# Patient Record
Sex: MALE | Race: WHITE | Employment: FULL TIME | URBAN - METROPOLITAN AREA
[De-identification: names, ages, dates, MRNs, and addresses within clinical notes are randomized per-mention and may not be internally consistent; named-entity substitution may affect disease eponyms.]

---

## 2018-12-02 ENCOUNTER — HOSPITAL ENCOUNTER (EMERGENCY)
Facility: CLINIC | Age: 33
Discharge: HOME | End: 2018-12-02
Attending: EMERGENCY MEDICINE | Admitting: EMERGENCY MEDICINE
Payer: COMMERCIAL

## 2018-12-02 VITALS
RESPIRATION RATE: 18 BRPM | TEMPERATURE: 97.7 F | DIASTOLIC BLOOD PRESSURE: 85 MMHG | OXYGEN SATURATION: 98 % | HEART RATE: 60 BPM | SYSTOLIC BLOOD PRESSURE: 128 MMHG

## 2018-12-02 DIAGNOSIS — F43.9 SITUATIONAL STRESS: ICD-10-CM

## 2018-12-02 DIAGNOSIS — F32.1 CURRENT MODERATE EPISODE OF MAJOR DEPRESSIVE DISORDER, UNSPECIFIED WHETHER RECURRENT (H): ICD-10-CM

## 2018-12-02 LAB
AMPHETAMINES UR QL SCN: NEGATIVE
BARBITURATES UR QL: NEGATIVE
BENZODIAZ UR QL: NEGATIVE
CANNABINOIDS UR QL SCN: NEGATIVE
COCAINE UR QL: NEGATIVE
ETHANOL SERPL-MCNC: <0.01 G/DL
OPIATES UR QL SCN: NEGATIVE
PCP UR QL SCN: NEGATIVE

## 2018-12-02 PROCEDURE — 99285 EMERGENCY DEPT VISIT HI MDM: CPT | Mod: 25

## 2018-12-02 PROCEDURE — 80307 DRUG TEST PRSMV CHEM ANLYZR: CPT | Performed by: EMERGENCY MEDICINE

## 2018-12-02 PROCEDURE — 90791 PSYCH DIAGNOSTIC EVALUATION: CPT

## 2018-12-02 PROCEDURE — 80320 DRUG SCREEN QUANTALCOHOLS: CPT | Performed by: EMERGENCY MEDICINE

## 2018-12-02 PROCEDURE — 25000132 ZZH RX MED GY IP 250 OP 250 PS 637: Performed by: EMERGENCY MEDICINE

## 2018-12-02 PROCEDURE — 36415 COLL VENOUS BLD VENIPUNCTURE: CPT | Performed by: EMERGENCY MEDICINE

## 2018-12-02 RX ORDER — DIPHENHYDRAMINE HCL 25 MG
25 CAPSULE ORAL ONCE
Status: DISCONTINUED | OUTPATIENT
Start: 2018-12-02 | End: 2018-12-02 | Stop reason: HOSPADM

## 2018-12-02 ASSESSMENT — ENCOUNTER SYMPTOMS: NERVOUS/ANXIOUS: 1

## 2018-12-02 NOTE — ED PROVIDER NOTES
History     Chief Complaint:  Suicidal ideation    HPI   HPI limited secondary to language barrier. HPI provided through interpretation by patient's wife.    Brandan Dixon is a 33 year old male, with a history of depression and anxiety, who presents with his wife to the emergency department for evaluation of suicidal ideation. The patient reports he moved to the [place] about six months ago from [facility] and has been having difficulty adapting. He reports he is  with a baby but does note panic attacks almost every day. He reports his family is currently living with his wife's parents and the patient finds it very difficult to live with her father because he makes him anxious and makes him feel like he is losing control over himself and his situation. He reports thoughts of self harm and suicide all the time and occasionally leaves home during these thoughts. He reports a suicidal plan of hanging himself and did have something around his neck today.    Allergies:  No known drug allergies     Medications:    The patient is not currently taking any prescribed medications.    Past Medical History:    Depression  Anxiety    Past Surgical History:    History reviewed. No pertinent surgical history.    Family History:    History reviewed. No pertinent family history.     Social History:  The patient presents to the emergency department with his wife.  Marital Status:   [2]     Review of Systems   Psychiatric/Behavioral: Positive for suicidal ideas. The patient is nervous/anxious.    All other systems reviewed and are negative.        Physical Exam   First Vitals:  BP: 128/85  Pulse: 60  Temp: 97.7  F (36.5  C)  Resp: 18  SpO2: 98 %    Physical Exam  General: The patient has a flat affect, in no respiratory distress.    HENT: Mucous membranes moist.    Cardiovascular: Regular rate and rhythm. Good pulses in all four extremities. Normal capillary refill and skin turgor.     Respiratory: Lungs are  clear. No nasal flaring. No retractions. No wheezing, no crackles.    Gastrointestinal: Abdomen soft. No guarding, no rebound. No palpable hernias.     Musculoskeletal: No gross deformity.     Skin: No rashes or petechiae.     Neurologic: The patient is alert and oriented x3. GCS 15. No testable cranial nerve deficit. Follows commands with clear and appropriate speech. Gives appropriate answers. Good strength in all extremities. No gross neurologic deficit. Gross sensation intact. Pupils are round and reactive. No meningismus.     Lymphatic: No cervical adenopathy. No lower extremity swelling.    Psychiatric: The patient is non-tearful. Patient did voice thoughts of harming himself.    Emergency Department Course   Laboratory:  Drug abuse screen 77 urine: Negative  Alcohol level blood: <0.01    Emergency Department Course:  Past medical records, nursing notes, and vitals reviewed.  1435: I performed an exam of the patient and obtained history, as documented above.    1517: I rechecked the patient.    1609: I discussed the patient with the DEC .    1706: I discussed the patient's assessment by the DEC  with DEC.    1724: I discussed the patient with DEC. DEC reports discussing the patient with Je, the patient's brother-in-law, who states the patient has never actively tried to harm himself.    1804: I rechecked the patient. Explained findings to the patient and his wife. Patient contracted for safety and reports an appointment in 2 days.    Findings and plan explained to the Patient and spouse. Patient discharged home with instructions regarding supportive care, medications, and reasons to return. The importance of close follow-up was reviewed.   Impression & Plan    Medical Decision Making:  The patient did speak english and spoke Bhutanese as well, I did offer an interpretor but his wife did well. The patient said that he has been depressed and has had some stressors since he has moved here from  [place] and lives with his father-in-law. There has been no verbal or physical confrontations. The patient has been under stress and has gone to a relative's house. After conversing with everyone, looks like there has been no dangerous behavior. He has been having fleeting thoughts of suicide but does not have a plan. No one feels like he's a danger to himself. We arranged an appointment in two days for him, at that point they can start medication. He is negative for alcohol and drugs. He is otherwise in good condition. He was discharged after I contracted for safety and both the patient and his wife say they will bring him back if he becomes actively suicidal or has a plan.     Diagnosis:    ICD-10-CM   1. Current moderate episode of major depressive disorder, unspecified whether recurrent (H) F32.1   2. Situational stress F43.9     Disposition:  Discharged to home with instructions for follow up.  Bjorn Go  12/2/2018   [facility] EMERGENCY DEPARTMENT  Scribe Disclosure:  I, Bjorn Go, am serving as a scribe at 2:35 PM on 12/2/2018 to document services personally performed by Manny Hutchinson MD based on my observations and the provider's statements to me.      Manny Hutchinson MD  12/02/18 2441

## 2018-12-02 NOTE — ED AVS SNAPSHOT
[facility] Emergency Department    [address]    [place] [address]    Phone:  [phone]    Fax:  [phone]                                       Brandan Dixon   MRN: [id number]    Department:  [facility] Emergency Department   Date of Visit:  12/2/2018           After Visit Summary Signature Page     I have received my discharge instructions, and my questions have been answered. I have discussed any challenges I see with this plan with the nurse or doctor.    ..........................................................................................................................................  Patient/Patient Representative Signature      ..........................................................................................................................................  Patient Representative Print Name and Relationship to Patient    ..................................................               ................................................  Date                                   Time    ..........................................................................................................................................  Reviewed by Signature/Title    ...................................................              ..............................................  Date                                               Time          22EPIC Rev 08/18

## 2018-12-02 NOTE — ED TRIAGE NOTES
Patient comes in with ahistory of depression and anxiety feeling panic, states he feels weak, hyper alert to surroundings, chest pain. Does have recurring thoughts of suicidal ideation. ABCs intact.

## 2018-12-02 NOTE — ED AVS SNAPSHOT
[facility] Emergency Department    [address] [place] [address]    Phone:  [phone]    Fax:  [phone]                                       Brandan Dixon   MRN: [id number]    Department:  [facility] Emergency Department   Date of Visit:  12/2/2018           Patient Information     Date Of Birth          1985        Your diagnoses for this visit were:     Current moderate episode of major depressive disorder, unspecified whether recurrent (H)     Situational stress        You were seen by Manny Hutchinson MD.      Follow-up Information     Follow up with psychiatry  In 2 days.      Discharge References/Attachments     DEPRESSION (Thai)      24 Hour Appointment Hotline       To make an appointment at any [facility] clinic, call [phone] ([phone]). If you don't have a family doctor or clinic, we will help you find one. [facility] clinics are conveniently located to serve the needs of you and your family.             Review of your medicines      Notice     You have not been prescribed any medications.            Procedures and tests performed during your visit     Alcohol level blood    Drug abuse screen 77 urine      Orders Needing Specimen Collection     None      Pending Results     No orders found from 11/30/2018 to 12/3/2018.            Pending Culture Results     No orders found from 11/30/2018 to 12/3/2018.            Pending Results Instructions     If you had any lab results that were not finalized at the time of your Discharge, you can call the ED Lab Result RN at [phone]. You will be contacted by this team for any positive Lab results or changes in treatment. The nurses are available 7 days a week from 10A to 6:30P.  You can leave a message 24 hours per day and they will return your call.        Test Results From Your Hospital Stay        12/2/2018  3:54 PM      Component Results     Component Value Ref Range & Units  Status    Amphetamine Qual Urine Negative NEG^Negative Final    Cutoff for a negative amphetamine is 500 ng/mL or less.    Barbiturates Qual Urine Negative NEG^Negative Final    Cutoff for a negative barbiturate is 200 ng/mL or less.    Benzodiazepine Qual Urine Negative NEG^Negative Final    Cutoff for a negative benzodiazepine is 200 ng/mL or less.    Cannabinoids Qual Urine Negative NEG^Negative Final    Cutoff for a negative cannabinoid is 50 ng/mL or less.    Cocaine Qual Urine Negative NEG^Negative Final    Cutoff for a negative cocaine is 300 ng/mL or less.    Opiates Qualitative Urine Negative NEG^Negative Final    Cutoff for a negative opiate is 300 ng/mL or less.    PCP Qual Urine Negative NEG^Negative Final    Cutoff for a negative PCP is 25 ng/mL or less.         12/2/2018  4:41 PM      Component Results     Component Value Ref Range & Units Status    Ethanol g/dL <0.01 <0.01 g/dL Final                Clinical Quality Measure: Blood Pressure Screening     Your blood pressure was checked while you were in the emergency department today. The last reading we obtained was  BP: 128/85 . Please read the guidelines below about what these numbers mean and what you should do about them.  If your systolic blood pressure (the top number) is less than 120 and your diastolic blood pressure (the bottom number) is less than 80, then your blood pressure is normal. There is nothing more that you need to do about it.  If your systolic blood pressure (the top number) is 120-139 or your diastolic blood pressure (the bottom number) is 80-89, your blood pressure may be higher than it should be. You should have your blood pressure rechecked within a year by a primary care provider.  If your systolic blood pressure (the top number) is 140 or greater or your diastolic blood pressure (the bottom number) is 90 or greater, you may have high blood pressure. High blood pressure is treatable, but if left untreated over time it can put  "you at risk for heart attack, stroke, or kidney failure. You should have your blood pressure rechecked by a primary care provider within the next 4 weeks.  If your provider in the emergency department today gave you specific instructions to follow-up with your doctor or provider even sooner than that, you should follow that instruction and not wait for up to 4 weeks for your follow-up visit.        Thank you for choosing [facility]       Thank you for choosing [facility] for your care. Our goal is always to provide you with excellent care. Hearing back from our patients is one way we can continue to improve our services. Please take a few minutes to complete the written survey that you may receive in the mail after you visit with us. Thank you!        [employer] Information     [employer] lets you send messages to your doctor, view your test results, renew your prescriptions, schedule appointments and more. To sign up, go to [email or web address] . Click on \"Log in\" on the left side of the screen, which will take you to the Welcome page. Then click on \"Sign up Now\" on the right side of the page.     You will be asked to enter the access code listed below, as well as some personal information. Please follow the directions to create your username and password.     Your access code is: [email or web address]  Expires: 2018  8:35 AM     Your access code will  in 90 days. If you need help or a new code, please call your [facility] clinic or [phone].        Care EveryWhere ID     This is your Care EveryWhere ID. This could be used by other organizations to access your [facility] medical records  [id number]        Equal Access to Services     [facility] : Hadii ariadna maza Soluis miguel, waaxda luqadaha, qaybta kaalmamari jose . So [facility] [phone].    ATENCIÓN: Si habla español, tiene a valdes disposición servicios gratuitos de asistencia lingüística. Llame al [phone].    We " comply with applicable federal civil rights laws and [place] laws. We do not discriminate on the basis of race, color, national origin, age, disability, sex, sexual orientation, or gender identity.            After Visit Summary       This is your record. Keep this with you and show to your community pharmacist(s) and doctor(s) at your next visit.

## 2021-01-10 ENCOUNTER — OFFICE VISIT (OUTPATIENT)
Dept: URGENT CARE | Facility: URGENT CARE | Age: 36
End: 2021-01-10
Payer: COMMERCIAL

## 2021-01-10 VITALS
TEMPERATURE: 98.9 F | SYSTOLIC BLOOD PRESSURE: 120 MMHG | DIASTOLIC BLOOD PRESSURE: 68 MMHG | RESPIRATION RATE: 14 BRPM | HEIGHT: 69 IN | HEART RATE: 65 BPM | BODY MASS INDEX: 27.4 KG/M2 | OXYGEN SATURATION: 99 % | WEIGHT: 185 LBS

## 2021-01-10 DIAGNOSIS — M79.10 MYALGIA: Primary | ICD-10-CM

## 2021-01-10 LAB
ALBUMIN SERPL-MCNC: 4 G/DL (ref 3.4–5)
ALP SERPL-CCNC: 121 U/L (ref 40–150)
ALT SERPL W P-5'-P-CCNC: 40 U/L (ref 0–70)
ANION GAP SERPL CALCULATED.3IONS-SCNC: 5 MMOL/L (ref 3–14)
AST SERPL W P-5'-P-CCNC: 29 U/L (ref 0–45)
BASOPHILS # BLD AUTO: 0 10E9/L (ref 0–0.2)
BASOPHILS NFR BLD AUTO: 0.6 %
BILIRUB SERPL-MCNC: 0.3 MG/DL (ref 0.2–1.3)
BUN SERPL-MCNC: 20 MG/DL (ref 7–30)
CALCIUM SERPL-MCNC: 8.7 MG/DL (ref 8.5–10.1)
CHLORIDE SERPL-SCNC: 105 MMOL/L (ref 94–109)
CK SERPL-CCNC: 481 U/L (ref 30–300)
CO2 SERPL-SCNC: 28 MMOL/L (ref 20–32)
CREAT SERPL-MCNC: 0.96 MG/DL (ref 0.66–1.25)
CRP SERPL-MCNC: <2.9 MG/L (ref 0–8)
DIFFERENTIAL METHOD BLD: NORMAL
EOSINOPHIL # BLD AUTO: 0.2 10E9/L (ref 0–0.7)
EOSINOPHIL NFR BLD AUTO: 4 %
ERYTHROCYTE [DISTWIDTH] IN BLOOD BY AUTOMATED COUNT: 14.1 % (ref 10–15)
FLUAV+FLUBV AG SPEC QL: NEGATIVE
FLUAV+FLUBV AG SPEC QL: NEGATIVE
GFR SERPL CREATININE-BSD FRML MDRD: >90 ML/MIN/{1.73_M2}
GLUCOSE SERPL-MCNC: 103 MG/DL (ref 70–99)
HCT VFR BLD AUTO: 41.8 % (ref 40–53)
HETEROPH AB SER QL: NEGATIVE
HGB BLD-MCNC: 13.7 G/DL (ref 13.3–17.7)
LYMPHOCYTES # BLD AUTO: 1.9 10E9/L (ref 0.8–5.3)
LYMPHOCYTES NFR BLD AUTO: 37.1 %
MCH RBC QN AUTO: 27.2 PG (ref 26.5–33)
MCHC RBC AUTO-ENTMCNC: 32.8 G/DL (ref 31.5–36.5)
MCV RBC AUTO: 83 FL (ref 78–100)
MONOCYTES # BLD AUTO: 0.4 10E9/L (ref 0–1.3)
MONOCYTES NFR BLD AUTO: 8.2 %
NEUTROPHILS # BLD AUTO: 2.5 10E9/L (ref 1.6–8.3)
NEUTROPHILS NFR BLD AUTO: 50.1 %
PLATELET # BLD AUTO: 224 10E9/L (ref 150–450)
POTASSIUM SERPL-SCNC: 3.8 MMOL/L (ref 3.4–5.3)
PROT SERPL-MCNC: 7.3 G/DL (ref 6.8–8.8)
RBC # BLD AUTO: 5.03 10E12/L (ref 4.4–5.9)
SODIUM SERPL-SCNC: 138 MMOL/L (ref 133–144)
SPECIMEN SOURCE: NORMAL
TSH SERPL DL<=0.005 MIU/L-ACNC: 1.92 MU/L (ref 0.4–4)
WBC # BLD AUTO: 5 10E9/L (ref 4–11)

## 2021-01-10 PROCEDURE — 86140 C-REACTIVE PROTEIN: CPT | Performed by: PREVENTIVE MEDICINE

## 2021-01-10 PROCEDURE — 82550 ASSAY OF CK (CPK): CPT | Performed by: PREVENTIVE MEDICINE

## 2021-01-10 PROCEDURE — U0005 INFEC AGEN DETEC AMPLI PROBE: HCPCS | Performed by: PREVENTIVE MEDICINE

## 2021-01-10 PROCEDURE — 80050 GENERAL HEALTH PANEL: CPT | Performed by: PREVENTIVE MEDICINE

## 2021-01-10 PROCEDURE — U0003 INFECTIOUS AGENT DETECTION BY NUCLEIC ACID (DNA OR RNA); SEVERE ACUTE RESPIRATORY SYNDROME CORONAVIRUS 2 (SARS-COV-2) (CORONAVIRUS DISEASE [COVID-19]), AMPLIFIED PROBE TECHNIQUE, MAKING USE OF HIGH THROUGHPUT TECHNOLOGIES AS DESCRIBED BY CMS-2020-01-R: HCPCS | Performed by: PREVENTIVE MEDICINE

## 2021-01-10 PROCEDURE — 86308 HETEROPHILE ANTIBODY SCREEN: CPT | Performed by: PREVENTIVE MEDICINE

## 2021-01-10 PROCEDURE — 87804 INFLUENZA ASSAY W/OPTIC: CPT | Performed by: PREVENTIVE MEDICINE

## 2021-01-10 PROCEDURE — 99204 OFFICE O/P NEW MOD 45 MIN: CPT | Performed by: PREVENTIVE MEDICINE

## 2021-01-10 PROCEDURE — 36415 COLL VENOUS BLD VENIPUNCTURE: CPT | Performed by: PREVENTIVE MEDICINE

## 2021-01-10 RX ORDER — SERTRALINE HYDROCHLORIDE 100 MG/1
TABLET, FILM COATED ORAL
COMMUNITY
Start: 2020-11-27

## 2021-01-10 RX ORDER — TRAZODONE HYDROCHLORIDE 50 MG/1
TABLET, FILM COATED ORAL
COMMUNITY
Start: 2020-12-25

## 2021-01-10 ASSESSMENT — MIFFLIN-ST. JEOR: SCORE: 1759.53

## 2021-01-10 NOTE — LETTER
[facility] [place]  [address] [place] [address]  Phone: [phone]    January 10, 2021        Brandan Dixon  [address] [address]          To whom it may concern:    RE: Brandan Dixon    Patient was seen and treated today at our clinic.  He may be off work on 1/11/2021 due to illness.    Please contact me for questions or concerns.      Sincerely,        Noam Traylor MD

## 2021-01-10 NOTE — PROGRESS NOTES
"SUBJECTIVE:  Brandan Dixon, a 35 year old male scheduled an appointment to discuss the following issues:  Fatigue  2 weeks ago  Tired  Sleeping ok  Achiness in both and  No cough  No fever  No difficulty breathing  No bleeding    No sick contacts  [employer]  Patient has been feeling fatigued and has some generalized achiness for the past 3 weeks.  No obvious sick contacts.  No congestion, cough, fever or chills, sore throat, chest pain.  Sleeping ok.  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  Was seen 3 years ago at [facility] with suicidal ideation.  States he feels like his depression is worse but has been taking his sertraline and trazodone regularly.  Wonders if there is a medical cause for his symptoms other than depressoin.  Feels rested upon awakening in the morning.  Patient with a history of depression. Also has a 3 yo baby at home and his wife is pregnant.  No SI or HI or delusions or hallucinations    Medicines - trazodone, sertraline - same over the past year    PMH - depression with suicidal ideation  SH - no smoking, no drugs, no alcohol, no recreational drug use  FH - non contributory    Medical, social, surgical, and family histories reviewed.    ROS:  CONSTITUTIONAL: NEGATIVE for fever, chills  EYES: NEGATIVE for vision changes   RESP: NEGATIVE for significant cough or SOB  CV: NEGATIVE for chest pain, palpitations   GI: NEGATIVE for nausea, abdominal pain, heartburn, or change in bowel habits  : NEGATIVE for frequency, dysuria, or hematuria  MUSCULOSKELETAL: NEGATIVE for significant arthralgias or myalgia  NEURO: NEGATIVE for weakness, dizziness or paresthesias or headache    OBJECTIVE:  /68   Pulse 65   Temp 98.9  F (37.2  C) (Oral)   Resp 14   Ht 1.753 m (5' 9\")   Wt 83.9 kg (185 lb)   SpO2 99%   BMI 27.32 kg/m    EXAM:  GENERAL APPEARANCE: healthy, alert and no distress  EYES: EOMI,  PERRL  HENT: ear canals and TM's normal " and nose and mouth without ulcers or lesions  RESP: lungs clear to auscultation - no rales, rhonchi or wheezes  CV: regular rates and rhythm, normal S1 S2, no S3 or S4 and no murmur, click or rub -  ABDOMEN:  soft, nontender, no HSM or masses and bowel sounds normal  EXTREMITIES:  Warm, well perfused, no edema  SKIN - no rashes or bruising    CBC wnl  Monospot negative  Influenza negative  COVID pending  CMP, CRP, TSH, CK all pending    Possible causes of symptoms are many - depression, CARMENCITA, hypothyroidism, anemia, other medical causes  Will assess for these and have close follow up with psychiatry and pcp    ASSESSMENT/PLAN:  (R53.83) Fatigue  (primary encounter diagnosis)  Plan: Symptomatic COVID-19 Virus (Coronavirus) by PCR  Suspect worsening depression  Contracted for safety  To see psychiatrist tomorrow  Will evaluate for medical causes with CK, CBC, CMP, COVID    Myalgia - CK, related to flu like illness?    Isolate until covid negative  Push fluids  Close follow up with psychiatry tomorrow.  Contracted for safety    45 minutes spent coordinating care, chart review, history taking and physical examination, considering possible causes of patient's symptoms and , explaining about nature of the patient's conditions and best initial treatment plan.

## 2021-01-11 NOTE — PATIENT INSTRUCTIONS
(R53.83) Fatigue  (primary encounter diagnosis)  Plan: Symptomatic COVID-19 Virus (Coronavirus) by PCR  Suspect worsening depression  Contracted for safety  To see psychiatrist tomorrow  Will evaluate for medical causes with CK, CBC, CMP, COVID    Myalgia - CK, related to flu like illness?    Isolate until covid negative  Push fluids  Close follow up with psychiatry tomorrow.  Contracted for safety

## 2021-01-12 LAB
SARS-COV-2 RNA RESP QL NAA+PROBE: NOT DETECTED
SPECIMEN SOURCE: NORMAL

## 2021-01-15 ENCOUNTER — HEALTH MAINTENANCE LETTER (OUTPATIENT)
Age: 36
End: 2021-01-15

## 2021-08-10 ENCOUNTER — HOSPITAL ENCOUNTER (EMERGENCY)
Facility: CLINIC | Age: 36
Discharge: LEFT BEFORE DISCHARGE | End: 2021-08-10
Admitting: EMERGENCY MEDICINE
Payer: COMMERCIAL

## 2021-08-10 VITALS
SYSTOLIC BLOOD PRESSURE: 121 MMHG | DIASTOLIC BLOOD PRESSURE: 67 MMHG | OXYGEN SATURATION: 98 % | BODY MASS INDEX: 27.4 KG/M2 | HEIGHT: 69 IN | TEMPERATURE: 98.3 F | WEIGHT: 185 LBS | HEART RATE: 45 BPM

## 2021-08-10 LAB — SARS-COV-2 RNA RESP QL NAA+PROBE: NEGATIVE

## 2021-08-10 PROCEDURE — U0003 INFECTIOUS AGENT DETECTION BY NUCLEIC ACID (DNA OR RNA); SEVERE ACUTE RESPIRATORY SYNDROME CORONAVIRUS 2 (SARS-COV-2) (CORONAVIRUS DISEASE [COVID-19]), AMPLIFIED PROBE TECHNIQUE, MAKING USE OF HIGH THROUGHPUT TECHNOLOGIES AS DESCRIBED BY CMS-2020-01-R: HCPCS | Performed by: EMERGENCY MEDICINE

## 2021-08-10 PROCEDURE — C9803 HOPD COVID-19 SPEC COLLECT: HCPCS

## 2021-08-10 PROCEDURE — 999N000104 HC STATISTIC NO CHARGE

## 2021-08-10 ASSESSMENT — MIFFLIN-ST. JEOR: SCORE: 1754.53

## 2021-08-10 NOTE — ED TRIAGE NOTES
Pt. Presents to ED with concerns for COVID. Pt. Reports he has a cough, headache, dizziness, and generalized weakness. Pt. Reports he is fully vaccinated. AVSS on RA.

## 2021-10-10 ENCOUNTER — HEALTH MAINTENANCE LETTER (OUTPATIENT)
Age: 36
End: 2021-10-10

## 2021-11-08 ENCOUNTER — OFFICE VISIT (OUTPATIENT)
Dept: URGENT CARE | Facility: URGENT CARE | Age: 36
End: 2021-11-08
Payer: COMMERCIAL

## 2021-11-08 VITALS
SYSTOLIC BLOOD PRESSURE: 110 MMHG | WEIGHT: 190 LBS | TEMPERATURE: 96.9 F | BODY MASS INDEX: 28.14 KG/M2 | HEIGHT: 69 IN | OXYGEN SATURATION: 97 % | HEART RATE: 109 BPM | DIASTOLIC BLOOD PRESSURE: 67 MMHG

## 2021-11-08 DIAGNOSIS — J06.9 UPPER RESPIRATORY TRACT INFECTION, UNSPECIFIED TYPE: Primary | ICD-10-CM

## 2021-11-08 DIAGNOSIS — R52 ACHES: ICD-10-CM

## 2021-11-08 LAB
DEPRECATED S PYO AG THROAT QL EIA: NEGATIVE
FLUAV AG SPEC QL IA: NEGATIVE
FLUBV AG SPEC QL IA: NEGATIVE

## 2021-11-08 PROCEDURE — U0003 INFECTIOUS AGENT DETECTION BY NUCLEIC ACID (DNA OR RNA); SEVERE ACUTE RESPIRATORY SYNDROME CORONAVIRUS 2 (SARS-COV-2) (CORONAVIRUS DISEASE [COVID-19]), AMPLIFIED PROBE TECHNIQUE, MAKING USE OF HIGH THROUGHPUT TECHNOLOGIES AS DESCRIBED BY CMS-2020-01-R: HCPCS | Performed by: PHYSICIAN ASSISTANT

## 2021-11-08 PROCEDURE — 87651 STREP A DNA AMP PROBE: CPT | Performed by: PHYSICIAN ASSISTANT

## 2021-11-08 PROCEDURE — 99203 OFFICE O/P NEW LOW 30 MIN: CPT | Performed by: PHYSICIAN ASSISTANT

## 2021-11-08 PROCEDURE — 87804 INFLUENZA ASSAY W/OPTIC: CPT | Performed by: PHYSICIAN ASSISTANT

## 2021-11-08 PROCEDURE — U0005 INFEC AGEN DETEC AMPLI PROBE: HCPCS | Performed by: PHYSICIAN ASSISTANT

## 2021-11-08 RX ORDER — IBUPROFEN 200 MG
200 TABLET ORAL EVERY 4 HOURS PRN
COMMUNITY

## 2021-11-08 RX ORDER — ACETAMINOPHEN 325 MG/1
325-650 TABLET ORAL EVERY 6 HOURS PRN
COMMUNITY

## 2021-11-08 ASSESSMENT — MIFFLIN-ST. JEOR: SCORE: 1777.21

## 2021-11-08 NOTE — PROGRESS NOTES
Aches  - Streptococcus A Rapid Screen w/Reflex to PCR - Clinic Collect  - Symptomatic COVID-19 Virus (Coronavirus) by PCR Nose; Future  - Influenza A/B antigen  - Symptomatic COVID-19 Virus (Coronavirus) by PCR Nose  - Group A Streptococcus PCR Throat Swab    Upper respiratory tract infection, unspecified type  Age 12 months or more  Okay to use Zarbee's   Okay to use Rx Children Tylenol if prescribed (Dose based on weight)    Age 2-12:   Okay to use Children Motrin or Tylenol over the counter.    Adults:  Okay to take acetaminophen 500 mg- 2 tabs (Total of 1000 mg) every 8 hrs   Okay to take ibuprofen 200 mg- 3 tabs (Total of 600 mg) every 6 hours        Okay to use Neti pot for sinus lavage up to three times daily for congestion and sinus pressure if present. Daily hot shower can be beneficial for congestion and body aches. Okay to use bedroom vaporizer or humidifier if symptoms are worse at night. Nightly Vicks Vapor rub and 5-10 mg of Melatonin okay to use for sleep.     Over the counter cough medication and decongestants okay if not prescribed by me during this visit. For homeopathic alternatives to cough syrup and decongestant, feel free to try Elderberry extract.    Okay to use salt water gargles, warm tea (or warm water with lemon and honey), and lozenges for any throat discomfort. Chloraseptic spray is also highly encourages for throat pain/irritation.     Patient will need to get plenty of rest and drink at least 1.5-2 liters of fluids daily for adults and 1-1.5 liters for children. If vomiting and not tolerating liquids for more than 24 hrs, please go to your nearest emergency department for IV fluids and further treatment.     Patient is not contagious after 1 week from start of symptoms. If possible, wear mask for first 7 days. Wash hands regularly and vigorously for 30 seconds often.     20 minutes spent on the date of the encounter doing chart review, history and exam, documentation and further  activities per the note     RADHA Amador [facility] U[facility]    Subjective   36 year old who presents to clinic today for the following health issues:    Urgent Care, Generalized Body Aches, Cough, and Nasal Congestion       HPI     Acute Illness  Acute illness concerns: Pt in clinic c/o cough, aches, fatigue, and congestion.  Onset/Duration: 5 days  Symptoms:  Fever: no  Chills/Sweats: no  Headache (location?): no  Sinus Pressure: no  Conjunctivitis:  no  Ear Pain: no  Rhinorrhea: YES  Congestion: YES  Sore Throat: YES  Cough: YES  Wheeze: no  Decreased Appetite: no  Nausea: no  Vomiting: no  Diarrhea: no  Dysuria/Freq.: no  Dysuria or Hematuria: no  Fatigue/Achiness: YES  Sick/Strep Exposure: None known  Therapies tried and outcome: None    Review of Systems   Review of Systems   See HPI     Objective    Temp: 96.9  F (36.1  C) Temp src: Oral BP: 110/67 Pulse: 109     SpO2: 97 %       Physical Exam   Physical Exam  Constitutional:       General: He is not in acute distress.     Appearance: Normal appearance. He is normal weight. He is not ill-appearing, toxic-appearing or diaphoretic.   HENT:      Head: Normocephalic and atraumatic.      Nose: Congestion and rhinorrhea present.      Mouth/Throat:      Mouth: Mucous membranes are moist.      Pharynx: Oropharynx is clear. No oropharyngeal exudate or posterior oropharyngeal erythema.   Cardiovascular:      Rate and Rhythm: Normal rate and regular rhythm.      Pulses: Normal pulses.      Heart sounds: Normal heart sounds. No murmur heard.  No friction rub. No gallop.    Pulmonary:      Effort: Pulmonary effort is normal. No respiratory distress.      Breath sounds: Normal breath sounds. No stridor. No wheezing, rhonchi or rales.   Chest:      Chest wall: No tenderness.   Musculoskeletal:      Cervical back: Normal range of motion and neck supple. No tenderness.   Lymphadenopathy:      Cervical: No cervical adenopathy.   Neurological:      General:  No focal deficit present.      Mental Status: He is alert and oriented to person, place, and time. Mental status is at baseline.      Gait: Gait normal.   Psychiatric:         Mood and Affect: Mood normal.         Behavior: Behavior normal.         Thought Content: Thought content normal.         Judgment: Judgment normal.          Results for orders placed or performed in visit on 11/08/21 (from the past 24 hour(s))   Streptococcus A Rapid Screen w/Reflex to PCR - Clinic Collect    Specimen: Throat; Swab   Result Value Ref Range    Group A Strep antigen Negative Negative   Influenza A/B antigen    Specimen: Nasopharyngeal; Swab   Result Value Ref Range    Influenza A antigen Negative Negative    Influenza B antigen Negative Negative    Narrative    Test results must be correlated with clinical data. If necessary, results should be confirmed by a molecular assay or viral culture.

## 2021-11-09 LAB
GROUP A STREP BY PCR: NOT DETECTED
SARS-COV-2 RNA RESP QL NAA+PROBE: NEGATIVE

## 2022-01-29 ENCOUNTER — HEALTH MAINTENANCE LETTER (OUTPATIENT)
Age: 37
End: 2022-01-29

## 2022-09-18 ENCOUNTER — HEALTH MAINTENANCE LETTER (OUTPATIENT)
Age: 37
End: 2022-09-18

## 2023-05-07 ENCOUNTER — HEALTH MAINTENANCE LETTER (OUTPATIENT)
Age: 38
End: 2023-05-07

## 2024-07-14 ENCOUNTER — HEALTH MAINTENANCE LETTER (OUTPATIENT)
Age: 39
End: 2024-07-14

## 2025-06-28 ENCOUNTER — HOSPITAL ENCOUNTER (EMERGENCY)
Facility: CLINIC | Age: 40
Discharge: HOME | End: 2025-06-29
Attending: EMERGENCY MEDICINE | Admitting: EMERGENCY MEDICINE
Payer: COMMERCIAL

## 2025-06-28 DIAGNOSIS — S82.831A CLOSED FRACTURE OF DISTAL END OF RIGHT FIBULA, UNSPECIFIED FRACTURE MORPHOLOGY, INITIAL ENCOUNTER: ICD-10-CM

## 2025-06-28 DIAGNOSIS — M25.571 ACUTE RIGHT ANKLE PAIN: ICD-10-CM

## 2025-06-28 PROCEDURE — 99283 EMERGENCY DEPT VISIT LOW MDM: CPT | Mod: 25 | Performed by: EMERGENCY MEDICINE

## 2025-06-28 PROCEDURE — 27788 TREATMENT OF ANKLE FRACTURE: CPT | Mod: RT | Performed by: EMERGENCY MEDICINE

## 2025-06-28 PROCEDURE — 99284 EMERGENCY DEPT VISIT MOD MDM: CPT | Mod: 25 | Performed by: EMERGENCY MEDICINE

## 2025-06-28 PROCEDURE — 27780 TREATMENT OF FIBULA FRACTURE: CPT | Mod: RT | Performed by: EMERGENCY MEDICINE

## 2025-06-28 ASSESSMENT — COLUMBIA-SUICIDE SEVERITY RATING SCALE - C-SSRS
6. HAVE YOU EVER DONE ANYTHING, STARTED TO DO ANYTHING, OR PREPARED TO DO ANYTHING TO END YOUR LIFE?: NO
2. HAVE YOU ACTUALLY HAD ANY THOUGHTS OF KILLING YOURSELF IN THE PAST MONTH?: NO
1. IN THE PAST MONTH, HAVE YOU WISHED YOU WERE DEAD OR WISHED YOU COULD GO TO SLEEP AND NOT WAKE UP?: NO

## 2025-06-29 ENCOUNTER — APPOINTMENT (OUTPATIENT)
Dept: GENERAL RADIOLOGY | Facility: CLINIC | Age: 40
End: 2025-06-29
Attending: EMERGENCY MEDICINE
Payer: COMMERCIAL

## 2025-06-29 VITALS
OXYGEN SATURATION: 99 % | TEMPERATURE: 98.6 F | BODY MASS INDEX: 28.14 KG/M2 | SYSTOLIC BLOOD PRESSURE: 119 MMHG | HEIGHT: 69 IN | DIASTOLIC BLOOD PRESSURE: 77 MMHG | HEART RATE: 88 BPM | RESPIRATION RATE: 18 BRPM | WEIGHT: 190 LBS

## 2025-06-29 PROCEDURE — 73610 X-RAY EXAM OF ANKLE: CPT | Mod: RT

## 2025-06-29 PROCEDURE — 73590 X-RAY EXAM OF LOWER LEG: CPT | Mod: 26 | Performed by: RADIOLOGY

## 2025-06-29 PROCEDURE — 250N000013 HC RX MED GY IP 250 OP 250 PS 637: Performed by: EMERGENCY MEDICINE

## 2025-06-29 PROCEDURE — 73610 X-RAY EXAM OF ANKLE: CPT | Mod: 26 | Performed by: RADIOLOGY

## 2025-06-29 PROCEDURE — 73590 X-RAY EXAM OF LOWER LEG: CPT | Mod: RT

## 2025-06-29 RX ORDER — QUETIAPINE FUMARATE 50 MG/1
50 TABLET, FILM COATED ORAL ONCE
Status: COMPLETED | OUTPATIENT
Start: 2025-06-29 | End: 2025-06-29

## 2025-06-29 RX ORDER — IBUPROFEN 600 MG/1
600 TABLET, FILM COATED ORAL ONCE
Status: COMPLETED | OUTPATIENT
Start: 2025-06-29 | End: 2025-06-29

## 2025-06-29 RX ORDER — GABAPENTIN 600 MG/1
600 TABLET ORAL ONCE
Status: COMPLETED | OUTPATIENT
Start: 2025-06-29 | End: 2025-06-29

## 2025-06-29 RX ORDER — TAMSULOSIN HYDROCHLORIDE 0.4 MG/1
0.4 CAPSULE ORAL ONCE
Status: COMPLETED | OUTPATIENT
Start: 2025-06-29 | End: 2025-06-29

## 2025-06-29 RX ORDER — BACLOFEN 10 MG/1
10 TABLET ORAL ONCE
Status: COMPLETED | OUTPATIENT
Start: 2025-06-29 | End: 2025-06-29

## 2025-06-29 RX ADMIN — GABAPENTIN 600 MG: 600 TABLET, FILM COATED ORAL at 03:16

## 2025-06-29 RX ADMIN — TAMSULOSIN HYDROCHLORIDE 0.4 MG: 0.4 CAPSULE ORAL at 03:16

## 2025-06-29 RX ADMIN — QUETIAPINE FUMARATE 50 MG: 50 TABLET ORAL at 03:59

## 2025-06-29 RX ADMIN — BACLOFEN 10 MG: 10 TABLET ORAL at 03:59

## 2025-06-29 RX ADMIN — IBUPROFEN 600 MG: 600 TABLET ORAL at 00:46

## 2025-06-29 ASSESSMENT — ACTIVITIES OF DAILY LIVING (ADL)
ADLS_ACUITY_SCORE: 41

## 2025-06-29 NOTE — ED PROVIDER NOTES
"  History     Chief Complaint   Patient presents with    Generalized Weakness    Fall    Ankle Pain     HPI  Brandan Dixon is a 40 year old male with PMH notable for autonomic dysfunction, thoracic burst fracture May 2024 with resulting paraplegia who presents to the ED with right ankle pain and swelling.  Patient reports that 2 days ago, he had some injections in his lumbar spine for degenerative disc disease.  He was warned that he would feel some degree of generalized weakness in the leg afterward.  When ambulating following the injections, he had a fall.  He landed on his right side.  No head injury.  Patient has had swelling and discomfort in the right ankle since then.  He has some limited sensation in the legs due to his paraplegia.  He denies other areas of injury.  Patient states that the generalized weakness feeling was expected for the injections, does not desire further workup for that.    Physical Exam   BP: 135/78  Pulse: 88  Temp: 98.5  F (36.9  C)  Resp: 18  Height: 175.3 cm (5' 9\")  Weight: 86.2 kg (190 lb)  SpO2: 97 %    Physical Exam  General: no acute distress. Appears stated age.   HENT: MMM, no oropharyngeal lesions  Eyes: PERRL, normal sclerae   Cardio: Regular rate. Regular rhythm. Extremities well perfused  Resp: Normal work of breathing, Normal respiratory rate.   MSK: Right ankle with swelling primarily around the lateral malleolus and tenderness present.  Normal ROM of the knee and hip on the right without swelling or deformities.  Neuro: alert without signs of confusion. CN II-XII grossly intact. Grossly normal strength and sensation in upper extremities, elevated tone and somewhat impaired strength in the lower extremities which patient reports is at baseline.   Psych: normal affect, normal behavior      ED Course      [facility]    -Fracture    Date/Time: 6/29/2025 3:00 AM    Performed by: Heriberto Azar MD  Authorized by: " Heriberto Azar MD    Risks, benefits and alternatives discussed.      INJURY      Injury location:  Ankle    Ankle injury location:  R ankle    Ankle fracture type: lateral malleolus      PRE PROCEDURE ASSESSMENT      Neurological function: normal      Distal perfusion: normal      Range of motion: reduced      ANESTHESIA (see MAR for exact dosages)      Anesthesia method:  None    PROCEDURE DETAILS:     Manipulation performed: no      Immobilization:  Brace (Cam boot)    POST PROCEDURE ASSESSMENT      Neurological function: normal      Distal perfusion: normal      Range of motion: unchanged      This procedure is expected to be definitive fracture care. Patient will be referred for follow-up fracture care.                Labs Ordered and Resulted from Time of ED Arrival to Time of ED Departure - No data to display  XR Tibia and Fibula Right 2 Views   Final Result   IMPRESSION: Distal right tibia and fibula are not entirely included in the field-of-study on the lateral view, better demonstrated on right ankle images (please see separate report). Subtle acute fracture involving the distal metaphysis of the right    fibula. Adjacent soft tissue swelling.      Ankle XR, G/E 3 views, right   Final Result   IMPRESSION: Subtle acute fracture involving the distal metaphysis of the right fibula with adjacent soft tissue swelling. Mild asymmetric prominence of the medial aspect of the ankle mortise. Talar dome is smooth. Minor plantar calcaneal spur.               Medical Decision Making  The patient's presentation was of moderate complexity (an acute complicated injury).    The patient's evaluation involved:  ordering and/or review of 2 test(s) in this encounter (see separate area of note for details)  independent interpretation of testing performed by another health professional (ankle and tib-fib x-ray)    The patient's management necessitated moderate risk (prescription drug management including medications  given in the ED).      Assessments & Plan   Patient presenting with right ankle pain and swelling following fall from standing. Vitals in the ED unremarkable. Nursing notes reviewed.     Ankle and tib-fib x-ray demonstrated lateral malleoli are fracture.  Cam boot applied.  Patient counseled on nonweightbearing status.  He has wheelchair with him and has crutches at home.    Patient noted need for his evening medications, which were provided.    The complete clinical picture is most consistent with lateral malleolar fracture. After counseling on the diagnosis, work-up, and treatment plan, the patient was discharged to home. The patient was advised to follow-up with orthopedics in about a week, referral placed. The patient was advised to return to the ED if worsening symptoms, or any urgent health concerns.     Final diagnoses:   Closed fracture of distal end of right fibula, unspecified fracture morphology, initial encounter   Acute right ankle pain     Discharge Medication List as of 6/29/2025  3:46 AM        --  Heriberto Azar MD   Emergency Medicine   [facility] EMERGENCY DEPARTMENT  6/28/2025       Heriberto Azar MD  06/29/25 0531

## 2025-06-29 NOTE — DISCHARGE INSTRUCTIONS
Instructions from your doctor today:  Emergency Department (ED) testing is focused on the potential causes of your symptoms that are the most dangerous possibilities, and cannot cover every possibility. Based on the evaluation, it was deemed sufficiently safe to discharge and continue management through the clinics. Thus, follow-up is very important to assess for improvement/worsening, potential further testing, and potential treatment adjustments. If you were given opioid pain medications or other medications that can make you drowsy while in the ED, you should not drive for at least several hours and not until you feel completely back to normal.     Do not bear weight on the injured ankle. You have a broken bone on the outside of your ankle.     Please make an appointment to follow up with:  - Orthopedics Clinic (phone: [phone]) in about 7 days. A referral has been placed and they should call you to schedule. If you do not get a call by Monday afternoon then call the above number.   - If you do not have a primary care provider, you can be seen in follow-up and establish care by calling any of the clinics below:     - [facility] (phone: [phone])     - Primary Care / [facility] (phone: [phone])   - Have your clinic provider review the results from today's visit with you again, including any potential follow-up or additional testing that may be needed based on the results. Occasionally, incidental findings are found on later review by radiologists that may need follow-up.     Return to the Emergency Department immediately if you have worsening symptoms, or any other urgent health concerns.

## 2025-06-29 NOTE — ED TRIAGE NOTES
Arrives by w/c with generalized weakness and right ankle swelling. States he got a steroid injection in his back yesterday. Denies hitting his head.    HX spinal cord injury

## 2025-06-30 ENCOUNTER — PATIENT OUTREACH (OUTPATIENT)
Dept: CARE COORDINATION | Facility: CLINIC | Age: 40
End: 2025-06-30
Payer: COMMERCIAL

## 2025-07-03 ENCOUNTER — OFFICE VISIT (OUTPATIENT)
Dept: PODIATRY | Facility: CLINIC | Age: 40
End: 2025-07-03
Attending: EMERGENCY MEDICINE

## 2025-07-03 DIAGNOSIS — M25.571 ACUTE RIGHT ANKLE PAIN: ICD-10-CM

## 2025-07-03 DIAGNOSIS — S82.831A CLOSED FRACTURE OF DISTAL END OF RIGHT FIBULA, UNSPECIFIED FRACTURE MORPHOLOGY, INITIAL ENCOUNTER: ICD-10-CM

## 2025-07-03 NOTE — LETTER
7/3/2025      Brandan Dixon  [address] [address]      Dear Colleague,    Thank you for referring your patient, Brandan Dixon, to the [facility]. Please see a copy of my visit note below.    ASSESSMENT:  Encounter Diagnoses   Name Primary?     Closed fracture of distal end of right fibula, unspecified fracture morphology, initial encounter      Acute right ankle pain      MEDICAL DECISION MAKING:  I personally reviewed the right ankle x-ray images.  Minimally displaced distal fibular fracture at the level of the ankle joint.    Recommendations:  Price therapy reviewed.  I think edema needs to be under better control and knee-high Tensogrip was provided.  Elevation above heart level  As needed cold application  6 to 8 weeks of Aircast immobilization  He is advised to remove the boot when seated for ankle range of motion exercises.    Follow-up for a recheck and x-ray in 3 weeks.    Disclaimer: This note consists of symbols derived from keyboarding, dictation and/or voice recognition software. As a result, there may be errors in the script that have gone undetected. Please consider this when interpreting information found in this chart.    Slim Watson DPM, FACFAS, MS    [place] Department of Podiatry/Foot & Ankle Surgery      ____________________________________________________________________    HPI:       Brandan Dixon follows up from an emergency department visit on 6/20/2025.  6/26/2025 he fell injuring his right side.  Due to discomfort and swelling, he presents emergency department.  X-rays revealed a transverse fracture at the distal fibula.  He is wearing a tall Aircast.  He does do some ambulation and transferring yet uses a wheelchair due to paraplegia.  This limits the sensation in his legs.    *  Past Medical History:   Diagnosis Date     Depressive disorder    *  *No past surgical history on file.*  *  Current Outpatient Medications   Medication Sig  Dispense Refill     acetaminophen (TYLENOL) 325 MG tablet Take 325-650 mg by mouth every 6 hours as needed for mild pain       ibuprofen (ADVIL/MOTRIN) 200 MG tablet Take 200 mg by mouth every 4 hours as needed for mild pain       sertraline (ZOLOFT) 100 MG tablet  (Patient not taking: Reported on 11/8/2021)       traZODone (DESYREL) 50 MG tablet  (Patient not taking: Reported on 11/8/2021)           EXAM:    Vitals: There were no vitals taken for this visit.  BMI: There is no height or weight on file to calculate BMI.  Vasc:      Generalized edema throughout the right ankle   Pedal pulses are palpable for the dorsalis pedis posterior tibial artery, bilateral foot.  Capillary fill time </= 3 seconds  Pedal skin appears well-perfused  Neuro:      Light touch sensation is diminished, left foot no apparent spastic contractures or other deformity secondary to neurologic compromise.  Derm:      No blisters no wounds   No worrisome lesions  MSK:      He is able to dorsiflex the right ankle to near neutral position.    Ankle range of motion is smooth.  There is some discomfort on palpatory exam laterally, yet overall decreased sensation.    Calf:    Neg for redness, swelling or tenderness    EXAM: XR ANKLE RIGHT G/E 3 VIEWS  LOCATION: [facility]  DATE: 6/29/2025     INDICATION: Fall with injury 2 days ago. Swelling about the lateral malleolus.  COMPARISON: X-ray right tibia and fibula 2 views 6/29/2025 at 0046 hours.                                                                      IMPRESSION: Subtle acute fracture involving the distal metaphysis of the right fibula with adjacent soft tissue swelling. Mild asymmetric prominence of the medial aspect of the ankle mortise. Talar dome is smooth. Minor plantar calcaneal spur.      Again, thank you for allowing me to participate in the care of your patient.        Sincerely,        Slim Watson DPM    Electronically signed

## 2025-07-03 NOTE — PROGRESS NOTES
ASSESSMENT:  Encounter Diagnoses   Name Primary?    Closed fracture of distal end of right fibula, unspecified fracture morphology, initial encounter     Acute right ankle pain      MEDICAL DECISION MAKING:  I personally reviewed the right ankle x-ray images.  Minimally displaced distal fibular fracture at the level of the ankle joint.    Recommendations:  Price therapy reviewed.  I think edema needs to be under better control and knee-high Tensogrip was provided.  Elevation above heart level  As needed cold application  6 to 8 weeks of Aircast immobilization  He is advised to remove the boot when seated for ankle range of motion exercises.    Follow-up for a recheck and x-ray in 3 weeks.    Disclaimer: This note consists of symbols derived from keyboarding, dictation and/or voice recognition software. As a result, there may be errors in the script that have gone undetected. Please consider this when interpreting information found in this chart.    Slim Watson DPM, FACFAS, [facility] Department of Podiatry/Foot & Ankle Surgery      ____________________________________________________________________    HPI:       Brandan Dixon follows up from an emergency department visit on 6/20/2025.  6/26/2025 he fell injuring his right side.  Due to discomfort and swelling, he presents emergency department.  X-rays revealed a transverse fracture at the distal fibula.  He is wearing a tall Aircast.  He does do some ambulation and transferring yet uses a wheelchair due to paraplegia.  This limits the sensation in his legs.    *  Past Medical History:   Diagnosis Date    Depressive disorder    *  *No past surgical history on file.*  *  Current Outpatient Medications   Medication Sig Dispense Refill    acetaminophen (TYLENOL) 325 MG tablet Take 325-650 mg by mouth every 6 hours as needed for mild pain      ibuprofen (ADVIL/MOTRIN) 200 MG tablet Take 200 mg by mouth every 4 hours as needed for mild pain      sertraline  (ZOLOFT) 100 MG tablet  (Patient not taking: Reported on 11/8/2021)      traZODone (DESYREL) 50 MG tablet  (Patient not taking: Reported on 11/8/2021)           EXAM:    Vitals: There were no vitals taken for this visit.  BMI: There is no height or weight on file to calculate BMI.  Vasc:      Generalized edema throughout the right ankle   Pedal pulses are palpable for the dorsalis pedis posterior tibial artery, bilateral foot.  Capillary fill time </= 3 seconds  Pedal skin appears well-perfused  Neuro:      Light touch sensation is diminished, left foot no apparent spastic contractures or other deformity secondary to neurologic compromise.  Derm:      No blisters no wounds   No worrisome lesions  MSK:      He is able to dorsiflex the right ankle to near neutral position.    Ankle range of motion is smooth.  There is some discomfort on palpatory exam laterally, yet overall decreased sensation.    Calf:    Neg for redness, swelling or tenderness    EXAM: XR ANKLE RIGHT G/E 3 VIEWS  LOCATION: [facility]  DATE: 6/29/2025     INDICATION: Fall with injury 2 days ago. Swelling about the lateral malleolus.  COMPARISON: X-ray right tibia and fibula 2 views 6/29/2025 at 0046 hours.                                                                      IMPRESSION: Subtle acute fracture involving the distal metaphysis of the right fibula with adjacent soft tissue swelling. Mild asymmetric prominence of the medial aspect of the ankle mortise. Talar dome is smooth. Minor plantar calcaneal spur.

## 2025-07-03 NOTE — Clinical Note
Kaylee,   Would you do anything different with this ankle fracture? I have him 6-8 weeks in tall Aircast Ambulating He has some decreased sensation due to paraplegia so exam limited. He apparently does do some ambulation, transfers and is in PT.   Yusuf

## 2025-07-03 NOTE — PATIENT INSTRUCTIONS
Thank you for choosing [facility] Pod[facility] / Foot & Ankle Surgery!    DR. CARTWRIGHT'S CLINIC LOCATIONS:     [facility] TRIAGE LINE: [phone]   [address] [place] [address] APPOINTMENTS: [phone]   [place] [address] RADIOLOGY: [phone]   (Every other Tues - Wed - Fri PM) SET UP SURGERY: [phone]    PHYSICAL THERAPY: [phone]   [facility] BILLING QUESTIONS: [phone] [address] FAX: [phone]   [place] [address]    (Thurs & Fri AM)       PRICE THERAPY  Many aches and pains throughout the foot and ankle can be helped with many simple treatments. This is usually described as PRICE Therapy.      P - Protection - often times, inflammation/pain in the lower extremity is not able to improve simply because the areas involved are never allowed to rest. Every step we take can bother the problematic area. Protecting those areas is an important step in the healing process. This may involve a walking cast boot, a special insert/orthotic device, an ankle brace, or simply avoiding barefoot walking.    R - Rest - in addition to protecting the foot/ankle, resting is an important, but often times difficult, treatment option. Getting off your feet when they bother you, and specifically avoiding activities that cause pain/discomfort, are very beneficial to prevent, and treat, foot/ankle pain.      I - Ice - icing regularly can help to decrease inflammation and swelling in the foot, thus decreasing pain. Using an ice pack or a bag of frozen veggies works very well. Ice for 20 minutes multiple times per day as needed.  Do not place the ice directly on the skin as this can cause tissue damage.    C - Compression - using a compression wrap or an ACE wrap can help to decrease swelling, which can help to decrease pain. Wearing the wraps is generally not needed at night, but they should be worn on a regular basis when you are going to be on your feet for prolonged periods as gravity tends  "to pull fluids down to your feet/ankles.    E - Elevation - elevating your lower extremities multiple times daily for 15-20 minutes can help to decrease swelling, which works well in decreasing pain levels.    NSAID/Tylenol - Anti-inflammatories like Aleve or ibuprofen, and/or a pain medication, such as Tylenol, can help to improve pain levels and get the issue resolved sooner rather than later. Anyone with liver issues should be careful with Tylenol, and anyone with high blood pressure or heart, stomach or kidney issues should be careful with anti-inflammatories. Please ask if you have questions about these medications, including dosage.    AIRCAST / CAM WALKING BOOT INSTRUCTIONS  - Do NOT drive with CAM walker on. This is due to safety and legal issues.   - Do NOT wear the CAM walker on long car/train rides or on an airplane.  - Remove the CAM walker several times a day and do ankle range of motion (ROM) exercises/wiggle toes.  - It is recommended that a thick-soled shoe be worn on the other foot to offset any created leg length issue.    - You can purchase an \"even up\" on [employer] to place under the other shoe to help too.  - The boot does not have to be worn at night.   - There is an increased risk of developing a blood clot with lower extremity immobilization. ROM exercises and knee-high compression (tenso /ACE wrap) is recommended to lower that risk.   - You should seek medical attention if you experience calf swelling and/or pain, chest pain, or shortness of breath.   If you need to return or exchange the boot, please contact [facility] @ [phone]  Ankle Fractures  What Is an Ankle Fracture?  A fracture is a partial or complete break in a bone. Fractures in the ankle can range from the less serious avulsion injuries (small pieces of bone that have been pulled off) to severe shattering-type breaks of the tibia, fibula, or both.  Ankle fractures are common injuries that are most often caused by " the ankle rolling inward or outward. Many people mistake an ankle fracture for an ankle sprain, but they are quite different and therefore require an accurate and early diagnosis. They sometimes occur simultaneously.  Symptoms  An ankle fracture is accompanied by one or all of these symptoms:  Pain at the site of the fracture, which in some cases can extend from the foot to the knee   Significant swelling, which may occur along the length of the leg or may be more localized   Blisters may occur over the fracture site. These should be promptly treated by a foot and ankle surgeon.   Bruising that develops soon after the injury   Inability to walk--however, it is possible to walk with less severe breaks, so never rely on walking as a test of whether a bone has been fractured   Change in the appearance of the ankle - it will look different from the other ankle   Bone protruding through the skin--a sign that immediate care is needed. Fractures that larson the skin require immediate attention because they can lead to severe infection and prolonged recovery.   Diagnosis  Following an ankle injury it is important to have the ankle evaluated by a foot and ankle surgeon for proper diagnosis and treatment. If you are unable to do so right away, go to the emergency room and then follow up with a foot and ankle surgeon as soon as possible for a more thorough assessment.  The affected limb will be examined by the foot and ankle surgeon by touching specific areas to evaluate the injury. In addition, the surgeon may order x-rays and other imaging studies, as necessary.  Non-Surgical Treatment  Treatment of ankle fractures depends upon the type and severity of the injury. At first, the foot and ankle surgeon will want you to follow the R.I.C.E. protocol:  Rest: Stay off the injured ankle. Walking may cause further injury.   Ice: Apply an ice pack to the injured area, placing a thin towel between the ice and the skin. Use ice for 20  minutes and then wait at least 40 minutes before icing again.   Compression: An elastic wrap should be used to control swelling.   Elevation: The ankle should be raised slightly above the level of your heart to reduce swelling.   Additional treatment options include:  Immobilization. Certain fractures are treated by protecting and restricting the ankle and foot in a cast or splint. This allows the bone to heal.   Prescription medications. To help relieve the pain, the surgeon may prescribe pain medications or anti-inflammatory drugs.   When is Surgery Needed?  For some ankle fractures, surgery is needed to repair the fracture and other soft tissue related injuries, if present. The foot and ankle surgeon will select the procedure that is appropriate for your injury.  Follow-up Care  It is important to follow your surgeon s instructions after treatment. Failure to do so can lead to infection, deformity, arthritis, and chronic pain.

## 2025-07-18 ENCOUNTER — TELEPHONE (OUTPATIENT)
Dept: PODIATRY | Facility: CLINIC | Age: 40
End: 2025-07-18
Payer: COMMERCIAL

## 2025-07-18 NOTE — TELEPHONE ENCOUNTER
Attempted to call patient to r/s 7/24/25 appt scheduled with Dr. Watson. No answer, voicemail full, so sent patient a GoTablet message to call to r/s appt

## 2025-07-19 ENCOUNTER — HEALTH MAINTENANCE LETTER (OUTPATIENT)
Age: 40
End: 2025-07-19

## 2025-07-28 NOTE — TELEPHONE ENCOUNTER
Other: [facility] calling back asking about having patient seen sooner for fracture follow up, is available most of the time, can call patient, [facility] or patients wife        [facility] Nikia [phone]   Marimar Patients Wife [phone]       Could we send this information to you in [employer] [place] or would you prefer to receive a phone call?:   Patient would prefer a phone call   Okay to leave a detailed message?: Yes at Cell number on file:    Telephone Information:   Mobile [phone]

## 2025-07-29 NOTE — TELEPHONE ENCOUNTER
Patient last seen by Dr. Watson on 7/3/25 for Closed fracture of distal end of right fibula.   Patient was to follow up in 3 weeks, but his appointment from 7/24/25 with Dr. Watson got cancelled due to provider out of the office and we were unable to reach patient.     There is no consent to communicate on file.     Phone call to yumiko Montejo [facility]. Inquired about work comp since patient's chart does not show this is a work comp injury. She states this is work comp from [employer] 2023. She reports being on the phone for part of the appointment with Dr. Watson on 7/3/25 and patient had identified her to the provider. She was provided our billing number to contact to get insurance changed to work comp for billing purposes.   She reports being an independent [facility] but the insurance provider for work comp is [employer]. She mailed an MAGO to patient today to sign for her.     Will check with provider to see when patient can be seen and get back with patient. She states he is willing to go to other locations and is aware he needs to answer his phone and watch for our call.     Left voicemail on patient's phone asking for a return call and that writer would try another number.     Phone call to wife, Marimar's number, and the mailbox is full. Unable to leave a message.     No appointments available until 8/6/25 acute slot in [facility].   Please advise if ok for patient to wait until then to be seen or where to work them in.     YANET Rodriguez RN

## 2025-07-29 NOTE — TELEPHONE ENCOUNTER
11/26/21 1707   Provider Notification   Provider Name/Title Dr Behl   Method of Notification Phone   MD updated parents would like to discharge at 48 hours. The parents have a car seat and a ride home. MD will discuss the plan with the oncoming on call MD and will call if there is an issue. MD will place orders if they are able to discharge.    Ok for patient to wait until 8/6/25 if no sooner openings per Dr. Watson.     There was an opening on 7/30/25 at 4:15 pm with 4pm check in.     Phone call to patient and he was informed of the above. Appointment scheduled as above at the [place] location. Address provided. Asked that he speak with MANUELA Montejo, and our billing office to get work comp information updated. He states there is a protocol and they may still be determining if work comp related. He will discuss with her further.     YANET Rodriguez RN

## 2025-08-21 ENCOUNTER — ANCILLARY PROCEDURE (OUTPATIENT)
Dept: GENERAL RADIOLOGY | Facility: CLINIC | Age: 40
End: 2025-08-21
Attending: PODIATRIST
Payer: COMMERCIAL

## 2025-08-21 ENCOUNTER — OFFICE VISIT (OUTPATIENT)
Dept: PODIATRY | Facility: CLINIC | Age: 40
End: 2025-08-21
Payer: OTHER MISCELLANEOUS

## 2025-08-21 DIAGNOSIS — S82.831A CLOSED FRACTURE OF DISTAL END OF RIGHT FIBULA, UNSPECIFIED FRACTURE MORPHOLOGY, INITIAL ENCOUNTER: Primary | ICD-10-CM

## 2025-08-21 DIAGNOSIS — S82.831A CLOSED FRACTURE OF DISTAL END OF RIGHT FIBULA, UNSPECIFIED FRACTURE MORPHOLOGY, INITIAL ENCOUNTER: ICD-10-CM
